# Patient Record
Sex: FEMALE | Race: WHITE | NOT HISPANIC OR LATINO | Employment: OTHER | URBAN - METROPOLITAN AREA
[De-identification: names, ages, dates, MRNs, and addresses within clinical notes are randomized per-mention and may not be internally consistent; named-entity substitution may affect disease eponyms.]

---

## 2022-09-22 ENCOUNTER — TRANSCRIBE ORDERS (OUTPATIENT)
Dept: ADMINISTRATIVE | Facility: HOSPITAL | Age: 72
End: 2022-09-22

## 2022-09-22 DIAGNOSIS — R13.10 DYSPHAGIA, UNSPECIFIED TYPE: Primary | ICD-10-CM

## 2022-09-28 ENCOUNTER — HOSPITAL ENCOUNTER (OUTPATIENT)
Dept: GENERAL RADIOLOGY | Facility: HOSPITAL | Age: 72
End: 2022-09-28

## 2022-09-28 ENCOUNTER — HOSPITAL ENCOUNTER (OUTPATIENT)
Dept: GENERAL RADIOLOGY | Facility: HOSPITAL | Age: 72
Discharge: HOME | End: 2022-09-28

## 2022-09-28 DIAGNOSIS — R13.10 DYSPHAGIA, UNSPECIFIED TYPE: ICD-10-CM

## 2022-09-28 PROCEDURE — 74221 X-RAY XM ESOPHAGUS 2CNTRST: CPT

## 2022-09-28 PROCEDURE — 63710000001 BARIUM SULFATE 98 % RECONSTITUTED SUSPENSION

## 2022-09-28 PROCEDURE — 63710000001 BARIUM SULFATE 700 MG TABLET

## 2022-09-28 PROCEDURE — A9270 NON-COVERED ITEM OR SERVICE: HCPCS

## 2022-09-28 PROCEDURE — 63710000001 SOD BICARB-CITRIC ACID-SIMETHICONE 2.21-1.53-0.04 G PACK

## 2022-09-28 RX ADMIN — BARIUM SULFATE 700 MG: 700 TABLET ORAL at 10:18

## 2022-09-28 RX ADMIN — ANTACID/ANTIFLATULENT 1 PACKET: 380; 550; 10; 10 GRANULE, EFFERVESCENT ORAL at 10:19

## 2022-09-28 RX ADMIN — BARIUM SULFATE 340 ML: 980 POWDER, FOR SUSPENSION ORAL at 10:18

## 2025-07-09 ENCOUNTER — HOSPITAL ENCOUNTER (EMERGENCY)
Facility: HOSPITAL | Age: 75
Discharge: OTHER INSTITUTION | End: 2025-07-10
Attending: EMERGENCY MEDICINE
Payer: MEDICARE

## 2025-07-09 ENCOUNTER — APPOINTMENT (OUTPATIENT)
Dept: CT IMAGING | Facility: HOSPITAL | Age: 75
End: 2025-07-09
Payer: MEDICARE

## 2025-07-09 DIAGNOSIS — G91.2 NORMAL PRESSURE HYDROCEPHALUS: Primary | ICD-10-CM

## 2025-07-09 DIAGNOSIS — R26.2 IMPAIRED AMBULATION: ICD-10-CM

## 2025-07-09 LAB
ALBUMIN SERPL-MCNC: 4.4 G/DL (ref 3.5–5.2)
ALBUMIN/GLOB SERPL: 1.6 G/DL
ALP SERPL-CCNC: 160 U/L (ref 39–117)
ALT SERPL W P-5'-P-CCNC: 10 U/L (ref 1–33)
ANION GAP SERPL CALCULATED.3IONS-SCNC: 8.8 MMOL/L (ref 5–15)
AST SERPL-CCNC: 21 U/L (ref 1–32)
BASOPHILS # BLD AUTO: 0.03 10*3/MM3 (ref 0–0.2)
BASOPHILS NFR BLD AUTO: 0.4 % (ref 0–1.5)
BILIRUB SERPL-MCNC: 1.4 MG/DL (ref 0–1.2)
BILIRUB UR QL STRIP: NEGATIVE
BUN SERPL-MCNC: 24.2 MG/DL (ref 8–23)
BUN/CREAT SERPL: 35.6 (ref 7–25)
CALCIUM SPEC-SCNC: 9.9 MG/DL (ref 8.6–10.5)
CHLORIDE SERPL-SCNC: 106 MMOL/L (ref 98–107)
CLARITY UR: CLEAR
CO2 SERPL-SCNC: 22.2 MMOL/L (ref 22–29)
COLOR UR: ABNORMAL
CREAT SERPL-MCNC: 0.68 MG/DL (ref 0.57–1)
D-LACTATE SERPL-SCNC: 0.9 MMOL/L (ref 0.5–2)
DEPRECATED RDW RBC AUTO: 47.2 FL (ref 37–54)
EGFRCR SERPLBLD CKD-EPI 2021: 90.4 ML/MIN/1.73
EOSINOPHIL # BLD AUTO: 0.05 10*3/MM3 (ref 0–0.4)
EOSINOPHIL NFR BLD AUTO: 0.7 % (ref 0.3–6.2)
ERYTHROCYTE [DISTWIDTH] IN BLOOD BY AUTOMATED COUNT: 13.6 % (ref 12.3–15.4)
GLOBULIN UR ELPH-MCNC: 2.8 GM/DL
GLUCOSE SERPL-MCNC: 105 MG/DL (ref 65–99)
GLUCOSE UR STRIP-MCNC: NEGATIVE MG/DL
HCT VFR BLD AUTO: 40.5 % (ref 34–46.6)
HGB BLD-MCNC: 13.3 G/DL (ref 12–15.9)
HGB UR QL STRIP.AUTO: NEGATIVE
IMM GRANULOCYTES # BLD AUTO: 0.01 10*3/MM3 (ref 0–0.05)
IMM GRANULOCYTES NFR BLD AUTO: 0.1 % (ref 0–0.5)
KETONES UR QL STRIP: ABNORMAL
LEUKOCYTE ESTERASE UR QL STRIP.AUTO: NEGATIVE
LYMPHOCYTES # BLD AUTO: 2.22 10*3/MM3 (ref 0.7–3.1)
LYMPHOCYTES NFR BLD AUTO: 31.1 % (ref 19.6–45.3)
MCH RBC QN AUTO: 30.4 PG (ref 26.6–33)
MCHC RBC AUTO-ENTMCNC: 32.8 G/DL (ref 31.5–35.7)
MCV RBC AUTO: 92.5 FL (ref 79–97)
MONOCYTES # BLD AUTO: 0.56 10*3/MM3 (ref 0.1–0.9)
MONOCYTES NFR BLD AUTO: 7.9 % (ref 5–12)
NEUTROPHILS NFR BLD AUTO: 4.26 10*3/MM3 (ref 1.7–7)
NEUTROPHILS NFR BLD AUTO: 59.8 % (ref 42.7–76)
NITRITE UR QL STRIP: NEGATIVE
PH UR STRIP.AUTO: 5.5 [PH] (ref 5–8)
PLATELET # BLD AUTO: 192 10*3/MM3 (ref 140–450)
PMV BLD AUTO: 9.2 FL (ref 6–12)
POTASSIUM SERPL-SCNC: 4 MMOL/L (ref 3.5–5.2)
PROT SERPL-MCNC: 7.2 G/DL (ref 6–8.5)
PROT UR QL STRIP: ABNORMAL
RBC # BLD AUTO: 4.38 10*6/MM3 (ref 3.77–5.28)
SODIUM SERPL-SCNC: 137 MMOL/L (ref 136–145)
SP GR UR STRIP: >=1.03 (ref 1–1.03)
UROBILINOGEN UR QL STRIP: ABNORMAL
WBC NRBC COR # BLD AUTO: 7.13 10*3/MM3 (ref 3.4–10.8)

## 2025-07-09 PROCEDURE — 99285 EMERGENCY DEPT VISIT HI MDM: CPT

## 2025-07-09 PROCEDURE — P9612 CATHETERIZE FOR URINE SPEC: HCPCS

## 2025-07-09 PROCEDURE — 99285 EMERGENCY DEPT VISIT HI MDM: CPT | Performed by: EMERGENCY MEDICINE

## 2025-07-09 PROCEDURE — 93005 ELECTROCARDIOGRAM TRACING: CPT | Performed by: EMERGENCY MEDICINE

## 2025-07-09 PROCEDURE — 93010 ELECTROCARDIOGRAM REPORT: CPT | Performed by: INTERNAL MEDICINE

## 2025-07-09 PROCEDURE — 25810000003 SODIUM CHLORIDE 0.9 % SOLUTION: Performed by: EMERGENCY MEDICINE

## 2025-07-09 PROCEDURE — 85025 COMPLETE CBC W/AUTO DIFF WBC: CPT | Performed by: EMERGENCY MEDICINE

## 2025-07-09 PROCEDURE — 81003 URINALYSIS AUTO W/O SCOPE: CPT | Performed by: EMERGENCY MEDICINE

## 2025-07-09 PROCEDURE — 83605 ASSAY OF LACTIC ACID: CPT | Performed by: EMERGENCY MEDICINE

## 2025-07-09 PROCEDURE — 80053 COMPREHEN METABOLIC PANEL: CPT | Performed by: EMERGENCY MEDICINE

## 2025-07-09 PROCEDURE — 70450 CT HEAD/BRAIN W/O DYE: CPT

## 2025-07-09 RX ADMIN — SODIUM CHLORIDE 500 ML: 9 INJECTION, SOLUTION INTRAVENOUS at 23:07

## 2025-07-10 VITALS
WEIGHT: 95 LBS | OXYGEN SATURATION: 95 % | HEART RATE: 72 BPM | BODY MASS INDEX: 16.22 KG/M2 | DIASTOLIC BLOOD PRESSURE: 88 MMHG | RESPIRATION RATE: 16 BRPM | HEIGHT: 64 IN | SYSTOLIC BLOOD PRESSURE: 163 MMHG | TEMPERATURE: 97.8 F

## 2025-07-10 LAB
QT INTERVAL: 416 MS
QTC INTERVAL: 436 MS

## 2025-07-10 RX ORDER — DONEPEZIL HYDROCHLORIDE 10 MG/1
10 TABLET, FILM COATED ORAL DAILY
COMMUNITY
Start: 2025-05-19 | End: 2025-08-17

## 2025-07-10 RX ORDER — MIRTAZAPINE 15 MG/1
15 TABLET, FILM COATED ORAL NIGHTLY
COMMUNITY
Start: 2025-06-05

## 2025-07-10 RX ORDER — CARBIDOPA AND LEVODOPA 25; 100 MG/1; MG/1
1 TABLET ORAL 3 TIMES DAILY
COMMUNITY

## 2025-07-10 RX ORDER — GABAPENTIN 300 MG/1
300 CAPSULE ORAL DAILY
COMMUNITY

## 2025-07-10 RX ORDER — TRAMADOL HYDROCHLORIDE 50 MG/1
50 TABLET ORAL DAILY
COMMUNITY

## 2025-07-10 RX ORDER — NITROFURANTOIN 25; 75 MG/1; MG/1
100 CAPSULE ORAL 2 TIMES DAILY
COMMUNITY
Start: 2025-07-06

## 2025-07-10 RX ORDER — FOLIC ACID 1 MG/1
1000 TABLET ORAL DAILY
COMMUNITY

## 2025-07-10 NOTE — ED NOTES
"Nursing report ED to floor  Lucretia Crabtree  74 y.o.  female    HPI :  HPI  Stated Reason for Visit: altered mental status Started about 2 days ago, patient's daughter thinks she has UTI, Hx of dementia and parkinsons  History Obtained From: family    Chief Complaint  Chief Complaint   Patient presents with    Altered Mental Status     Started about 2 days ago, patient's daughter thinks she has UTI, Hx of dementia and parkinsons       Admitting doctor:   No admitting provider for patient encounter.    Admitting diagnosis:   The primary encounter diagnosis was Normal pressure hydrocephalus. A diagnosis of Impaired ambulation was also pertinent to this visit.    Code status:   Current Code Status       Date Active Code Status Order ID Comments User Context       Not on file            Allergies:   Patient has no known allergies.    Isolation:   No active isolations    Intake and Output    Intake/Output Summary (Last 24 hours) at 7/10/2025 0120  Last data filed at 7/10/2025 0013  Gross per 24 hour   Intake 500 ml   Output 200 ml   Net 300 ml       Weight:       07/10/25  0016   Weight: 43.1 kg (95 lb)       Most recent vitals:   Vitals:    07/10/25 0016 07/10/25 0030 07/10/25 0040 07/10/25 0100   BP:  142/76  163/91   BP Location:       Patient Position:       Pulse:  66 67 80   Resp:       Temp:       TempSrc:       SpO2:   97% 93%   Weight: 43.1 kg (95 lb)      Height: 162.6 cm (64\")          Active LDAs/IV Access:   Lines, Drains & Airways       Active LDAs       Name Placement date Placement time Site Days    Peripheral IV 07/09/25 2217 20 G Left Antecubital 07/09/25 2217  Antecubital  less than 1                    Labs (abnormal labs have a star):   Labs Reviewed   COMPREHENSIVE METABOLIC PANEL - Abnormal; Notable for the following components:       Result Value    Glucose 105 (*)     BUN 24.2 (*)     Alkaline Phosphatase 160 (*)     Total Bilirubin 1.4 (*)     BUN/Creatinine Ratio 35.6 (*)     All other " components within normal limits    Narrative:     GFR Categories in Chronic Kidney Disease (CKD)              GFR Category          GFR (mL/min/1.73)    Interpretation  G1                    90 or greater        Normal or high (1)  G2                    60-89                Mild decrease (1)  G3a                   45-59                Mild to moderate decrease  G3b                   30-44                Moderate to severe decrease  G4                    15-29                Severe decrease  G5                    14 or less           Kidney failure    (1)In the absence of evidence of kidney disease, neither GFR category G1 or G2 fulfill the criteria for CKD.    eGFR calculation 2021 CKD-EPI creatinine equation, which does not include race as a factor   URINALYSIS W/ CULTURE IF INDICATED - Abnormal; Notable for the following components:    Color, UA Nadege (*)     Ketones, UA Trace (*)     Protein, UA Trace (*)     All other components within normal limits    Narrative:     In absence of clinical symptoms, the presence of pyuria, bacteria, and/or nitrites on the urinalysis result does not correlate with infection.  Urine microscopic not indicated.   CBC WITH AUTO DIFFERENTIAL - Normal   LACTIC ACID, PLASMA - Normal   CBC AND DIFFERENTIAL    Narrative:     The following orders were created for panel order CBC & Differential.  Procedure                               Abnormality         Status                     ---------                               -----------         ------                     CBC Auto Differential[[id number]]        Normal              Final result                 Please view results for these tests on the individual orders.       EKG:   ECG 12 Lead Altered Mental Status   Preliminary Result   HEART RATE=66  bpm   RR Hkyxfjsu=918  ms   ID Eqvkxsug=264  ms   P Horizontal Axis=-35  deg   P Front Axis=75  deg   QRSD Interval=88  ms   QT Zlvkfwsc=166  ms   BNiR=525  ms   QRS Axis=-7  deg   T Wave Axis=25   deg   - ABNORMAL ECG -   Sinus rhythm   Left atrial enlargement   RSR' in V1 or V2, right VCD or RVH   Borderline T abnormalities, anterior leads   Date and Time of Study:2025-07-09 22:18:52          Meds given in ED:   Medications   sodium chloride 0.9 % bolus 500 mL (0 mL Intravenous Stopped 7/10/25 0013)       Imaging results:  CT Head Without Contrast  Result Date: 7/9/2025  No acute intracranial hemorrhage is seen. Degree of ventricular dilatation does appear somewhat out of proportion to the degree of atrophy, and this raises possibility of normal pressure hydrocephalus.  Radiation dose reduction techniques were utilized, including automated exposure control and exposure modulation based on body size.   This report was finalized on 7/9/2025 11:18 PM by Dr. Tiffany Camargo M.D on Workstation: [id number]        Ambulatory status:   - Bedrest; Report to Bella LOCKETT RN    Social issues:   Social History     Socioeconomic History    Marital status:    Substance and Sexual Activity    Alcohol use: Never       Peripheral Neurovascular  Peripheral Neurovascular (Adult)  Peripheral Neurovascular WDL: WDL    Neuro Cognitive  Neuro Cognitive (Adult)  Cognitive/Neuro/Behavioral WDL: orientation, all  Level of Consciousness: Confusion  Arousal Level: arouses to voice  Orientation: time, situation  Speech: garbled  Mood/Behavior: calm    Learning  Learning Assessment  Learning Readiness and Ability: cognitive limitation noted    Respiratory  Respiratory  Airway WDL: WDL  Respiratory WDL  Respiratory WDL: WDL    Abdominal Pain       Pain Assessments  Pain (Adult)  (0-10) Pain Rating: Rest: 0    NIH Stroke Scale       Mame Mock RN  07/10/25 01:20 EDT

## 2025-07-10 NOTE — ED NOTES
"Nursing report ED to floor  Lucretia Crabtree  74 y.o.  female    HPI :  HPI  Stated Reason for Visit: altered mental status Started about 2 days ago, patient's daughter thinks she has UTI, Hx of dementia and parkinsons  History Obtained From: family    Chief Complaint  Chief Complaint   Patient presents with    Altered Mental Status     Started about 2 days ago, patient's daughter thinks she has UTI, Hx of dementia and parkinsons       Admitting doctor:   No admitting provider for patient encounter.    Admitting diagnosis:   The primary encounter diagnosis was Normal pressure hydrocephalus. A diagnosis of Impaired ambulation was also pertinent to this visit.    Code status:   Current Code Status       Date Active Code Status Order ID Comments User Context       Not on file            Allergies:   Patient has no known allergies.    Isolation:   No active isolations    Intake and Output    Intake/Output Summary (Last 24 hours) at 7/10/2025 0103  Last data filed at 7/10/2025 0013  Gross per 24 hour   Intake 500 ml   Output 200 ml   Net 300 ml       Weight:       07/10/25  0016   Weight: 43.1 kg (95 lb)       Most recent vitals:   Vitals:    07/10/25 0013 07/10/25 0016 07/10/25 0030 07/10/25 0040   BP:   142/76    BP Location:       Patient Position:       Pulse:   66 67   Resp:       Temp:       TempSrc:       SpO2:    97%   Weight:  43.1 kg (95 lb)     Height: 162.6 cm (64\") 162.6 cm (64\")         Active LDAs/IV Access:   Lines, Drains & Airways       Active LDAs       Name Placement date Placement time Site Days    Peripheral IV 07/09/25 2217 20 G Left Antecubital 07/09/25 2217  Antecubital  less than 1                    Labs (abnormal labs have a star):   Labs Reviewed   COMPREHENSIVE METABOLIC PANEL - Abnormal; Notable for the following components:       Result Value    Glucose 105 (*)     BUN 24.2 (*)     Alkaline Phosphatase 160 (*)     Total Bilirubin 1.4 (*)     BUN/Creatinine Ratio 35.6 (*)     All other " components within normal limits    Narrative:     GFR Categories in Chronic Kidney Disease (CKD)              GFR Category          GFR (mL/min/1.73)    Interpretation  G1                    90 or greater        Normal or high (1)  G2                    60-89                Mild decrease (1)  G3a                   45-59                Mild to moderate decrease  G3b                   30-44                Moderate to severe decrease  G4                    15-29                Severe decrease  G5                    14 or less           Kidney failure    (1)In the absence of evidence of kidney disease, neither GFR category G1 or G2 fulfill the criteria for CKD.    eGFR calculation 2021 CKD-EPI creatinine equation, which does not include race as a factor   URINALYSIS W/ CULTURE IF INDICATED - Abnormal; Notable for the following components:    Color, UA Nadege (*)     Ketones, UA Trace (*)     Protein, UA Trace (*)     All other components within normal limits    Narrative:     In absence of clinical symptoms, the presence of pyuria, bacteria, and/or nitrites on the urinalysis result does not correlate with infection.  Urine microscopic not indicated.   CBC WITH AUTO DIFFERENTIAL - Normal   LACTIC ACID, PLASMA - Normal   CBC AND DIFFERENTIAL    Narrative:     The following orders were created for panel order CBC & Differential.  Procedure                               Abnormality         Status                     ---------                               -----------         ------                     CBC Auto Differential[[id number]]        Normal              Final result                 Please view results for these tests on the individual orders.       EKG:   ECG 12 Lead Altered Mental Status   Preliminary Result   HEART RATE=66  bpm   RR Kshqjqgw=884  ms   NM Xpwniepe=186  ms   P Horizontal Axis=-35  deg   P Front Axis=75  deg   QRSD Interval=88  ms   QT Yyuckwgv=545  ms   JJjT=777  ms   QRS Axis=-7  deg   T Wave Axis=25   deg   - ABNORMAL ECG -   Sinus rhythm   Left atrial enlargement   RSR' in V1 or V2, right VCD or RVH   Borderline T abnormalities, anterior leads   Date and Time of Study:2025-07-09 22:18:52          Meds given in ED:   Medications   sodium chloride 0.9 % bolus 500 mL (0 mL Intravenous Stopped 7/10/25 0013)       Imaging results:  CT Head Without Contrast  Result Date: 7/9/2025  No acute intracranial hemorrhage is seen. Degree of ventricular dilatation does appear somewhat out of proportion to the degree of atrophy, and this raises possibility of normal pressure hydrocephalus.  Radiation dose reduction techniques were utilized, including automated exposure control and exposure modulation based on body size.   This report was finalized on 7/9/2025 11:18 PM by Dr. Tiffany Camargo M.D on Workstation: [id number]        Ambulatory status:   - bedrest    Social issues:   Social History     Socioeconomic History    Marital status:    Substance and Sexual Activity    Alcohol use: Never       Peripheral Neurovascular  Peripheral Neurovascular (Adult)  Peripheral Neurovascular WDL: WDL    Neuro Cognitive  Neuro Cognitive (Adult)  Cognitive/Neuro/Behavioral WDL: orientation, all  Level of Consciousness: Confusion  Arousal Level: arouses to voice  Orientation: time, situation  Speech: garbled  Mood/Behavior: calm    Learning  Learning Assessment  Learning Readiness and Ability: cognitive limitation noted    Respiratory  Respiratory  Airway WDL: WDL  Respiratory WDL  Respiratory WDL: WDL    Abdominal Pain       Pain Assessments  Pain (Adult)  (0-10) Pain Rating: Rest: 0    NIH Stroke Scale       Laila Clements RN  07/10/25 01:03 EDT

## 2025-07-10 NOTE — ED NOTES
Dr. Iqbal notified of patients current complaint, dementia/parkinsons hx and recent diagnosis of UTI.  Requested CT of Head Without.  Per Dr. Iqbal this is not a Team D and will not activate such at this time.

## 2025-07-10 NOTE — FSED PROVIDER NOTE
Subjective   History of Present Illness  Patient has been brought by her daughter.  She has had increasing fogginess of mentation according to her daughter.  The patient though does have Alzheimer dementia.  And her mentations have been decreasing where she is only getting about an hour at a time of lucency and then she is in a fog the rest of the time.  That seems to have gotten less since she was treated for urinary tract infection with nitrofurantoin that started on Sunday night.  It is now been 3-1/2 days her 7 tablets and she is not improving in her mentation.  I did explain to the daughter though that Alzheimer's dementia has a negative slope of mentation but the negative slope is met with troughs and peaks of orientation and lucency such that it is difficult to detect the slow decrease that occurs.  This decreased however is accentuated by the medication she is on for her Parkinson's as well as the urinary tract infection.  So this would make it more difficult for her to have the peaks of mentation that she is used to seeing.      Review of Systems   Psychiatric/Behavioral:  Positive for confusion.    All other systems reviewed and are negative.      Past Medical History:   Diagnosis Date    Dementia     Parkinson's disease        No Known Allergies    History reviewed. No pertinent surgical history.    History reviewed. No pertinent family history.    Social History     Socioeconomic History    Marital status:    Substance and Sexual Activity    Alcohol use: Never           Objective   Physical Exam  Vitals and nursing note reviewed.   Constitutional:       General: She is not in acute distress.     Appearance: She is well-developed. She is not ill-appearing, toxic-appearing or diaphoretic.   HENT:      Head: Normocephalic and atraumatic.      Mouth/Throat:      Mouth: Mucous membranes are moist.   Eyes:      Extraocular Movements: Extraocular movements intact.      Pupils: Pupils are equal, round,  and reactive to light.   Cardiovascular:      Rate and Rhythm: Normal rate and regular rhythm.      Heart sounds: Normal heart sounds.   Pulmonary:      Effort: Pulmonary effort is normal.      Breath sounds: Normal breath sounds.   Abdominal:      General: Bowel sounds are normal.      Palpations: Abdomen is soft.   Musculoskeletal:         General: Normal range of motion.      Cervical back: Normal range of motion and neck supple.   Skin:     General: Skin is warm and dry.   Neurological:      Mental Status: She is alert.      Cranial Nerves: No cranial nerve deficit, dysarthria or facial asymmetry.      Sensory: No sensory deficit.      Motor: No weakness.      Coordination: Coordination normal.      Comments: Patient has no focal deficits she has an NIH of 0 actually her speech is not slurred she has no facial droop she has no drift is able to move all her extremities and hold them against gravity for 10 seconds.  The patient is attentive she follows me in the room she listens but only answers and clear speech occasionally.   Psychiatric:         Mood and Affect: Mood normal.         Procedures           ED Course                                           Medical Decision Making  Patient I cannot compare the CT scan done at [facility] however there is no mention of hydrocephalus.  This scan shows the possibility of normal pressure hydrocephalus because a proportion of ventricular dilatation and atrophy are out of proportion.  She cannot walk and her confusion is is more this may very well be due to her Alzheimer's Parkinson's and even Alzheimer's and Parkinson's could be the cause of her normal pressure hydrocephalus.  I am calling for transfer to [facility] her urine is negative patient's family the daughter is requesting [facility] since they are where they went first and they are closer to their home.  Patient's white count is only 7 electrolytes are normal BUN slightly dehydrated with a BUN of 24.2.    I spoke with  neurosurgery who cleared to go to the hospitalist I spoke with Werner who is covering the hospitalist [facility]. [facility] are any why and the patient's being in mated to [facility] 4-A bed 8    Problems Addressed:  Impaired ambulation: complicated acute illness or injury  Normal pressure hydrocephalus: complicated acute illness or injury    Amount and/or Complexity of Data Reviewed  Labs: ordered.     Details: Patient's white count is only 7.1 the patient's lactic acid is normal at 0.9 urine does have some trace ketones and trace protein but no leukoesterase no WBCs no RBCs no nitrites.  Patient's BUN elevated 24.2 creatinine 0.68.  She is slightly dehydrated.  Radiology: ordered.     Details: No acute intracranial hemorrhage is seen. Degree of ventricular   dilatation does appear somewhat out of proportion to the degree of   atrophy, and this raises possibility of normal pressure hydrocephalus.     ECG/medicine tests: ordered.     Details: EKG interpreted by myself shows a normal sinus rhythm with a ventricular rate of 66 NC interval 131 ms and QT corrected 436 ms there is no ST-T wave changes.        Final diagnoses:   Normal pressure hydrocephalus   Impaired ambulation       ED Disposition  ED Disposition       ED Disposition   Transfer to Another Facility     Condition   --    Comment   --               No follow-up provider specified.       Medication List      No changes were made to your prescriptions during this visit.

## 2025-07-10 NOTE — ED NOTES
Pts daughter reports decline in mobility, oral intake and altered mental status x 2 days. Currently being treated with antibiotic for a uti